# Patient Record
Sex: MALE | Race: BLACK OR AFRICAN AMERICAN | URBAN - METROPOLITAN AREA
[De-identification: names, ages, dates, MRNs, and addresses within clinical notes are randomized per-mention and may not be internally consistent; named-entity substitution may affect disease eponyms.]

---

## 2021-07-15 ENCOUNTER — WALK IN (OUTPATIENT)
Dept: URGENT CARE | Age: 36
End: 2021-07-15

## 2021-07-15 DIAGNOSIS — Z11.1 SCREENING FOR TUBERCULOSIS: Primary | ICD-10-CM

## 2021-07-15 PROCEDURE — 86580 TB INTRADERMAL TEST: CPT | Performed by: NURSE PRACTITIONER

## 2021-07-17 ENCOUNTER — WALK IN (OUTPATIENT)
Dept: URGENT CARE | Age: 36
End: 2021-07-17

## 2021-07-17 DIAGNOSIS — Z11.1 SCREENING FOR TUBERCULOSIS: ICD-10-CM

## 2021-07-17 LAB
INDURATION: 0 MM (ref 0–?)
SKIN TEST RESULT: NEGATIVE

## 2021-07-17 PROCEDURE — X1094 NO CHARGE VISIT: HCPCS | Performed by: NURSE PRACTITIONER

## 2024-06-11 ENCOUNTER — LAB ENCOUNTER (OUTPATIENT)
Dept: LAB | Facility: HOSPITAL | Age: 39
End: 2024-06-11
Attending: INTERNAL MEDICINE
Payer: COMMERCIAL

## 2024-06-11 DIAGNOSIS — K62.5 RECTAL BLEEDING: ICD-10-CM

## 2024-06-11 LAB
ALBUMIN SERPL-MCNC: 4.2 G/DL (ref 3.4–5)
ALBUMIN/GLOB SERPL: 1.1 {RATIO} (ref 1–2)
ALP LIVER SERPL-CCNC: 99 U/L
ALT SERPL-CCNC: 79 U/L
ANION GAP SERPL CALC-SCNC: 6 MMOL/L (ref 0–18)
AST SERPL-CCNC: 47 U/L (ref 15–37)
BASOPHILS # BLD AUTO: 0.03 X10(3) UL (ref 0–0.2)
BASOPHILS NFR BLD AUTO: 0.7 %
BILIRUB SERPL-MCNC: 0.5 MG/DL (ref 0.1–2)
BUN BLD-MCNC: 13 MG/DL (ref 9–23)
CALCIUM BLD-MCNC: 9.6 MG/DL (ref 8.5–10.1)
CHLORIDE SERPL-SCNC: 106 MMOL/L (ref 98–112)
CO2 SERPL-SCNC: 26 MMOL/L (ref 21–32)
CREAT BLD-MCNC: 0.9 MG/DL
EGFRCR SERPLBLD CKD-EPI 2021: 111 ML/MIN/1.73M2 (ref 60–?)
EOSINOPHIL # BLD AUTO: 0.14 X10(3) UL (ref 0–0.7)
EOSINOPHIL NFR BLD AUTO: 3.4 %
ERYTHROCYTE [DISTWIDTH] IN BLOOD BY AUTOMATED COUNT: 14.7 %
FASTING STATUS PATIENT QL REPORTED: YES
GLOBULIN PLAS-MCNC: 3.7 G/DL (ref 2.8–4.4)
GLUCOSE BLD-MCNC: 89 MG/DL (ref 70–99)
HCT VFR BLD AUTO: 45 %
HGB BLD-MCNC: 14.3 G/DL
IMM GRANULOCYTES # BLD AUTO: 0.01 X10(3) UL (ref 0–1)
IMM GRANULOCYTES NFR BLD: 0.2 %
LYMPHOCYTES # BLD AUTO: 2.13 X10(3) UL (ref 1–4)
LYMPHOCYTES NFR BLD AUTO: 51.7 %
MCH RBC QN AUTO: 25.8 PG (ref 26–34)
MCHC RBC AUTO-ENTMCNC: 31.8 G/DL (ref 31–37)
MCV RBC AUTO: 81.2 FL
MONOCYTES # BLD AUTO: 0.39 X10(3) UL (ref 0.1–1)
MONOCYTES NFR BLD AUTO: 9.5 %
NEUTROPHILS # BLD AUTO: 1.42 X10 (3) UL (ref 1.5–7.7)
NEUTROPHILS # BLD AUTO: 1.42 X10(3) UL (ref 1.5–7.7)
NEUTROPHILS NFR BLD AUTO: 34.5 %
OSMOLALITY SERPL CALC.SUM OF ELEC: 286 MOSM/KG (ref 275–295)
PLATELET # BLD AUTO: 228 10(3)UL (ref 150–450)
POTASSIUM SERPL-SCNC: 4.4 MMOL/L (ref 3.5–5.1)
PROT SERPL-MCNC: 7.9 G/DL (ref 6.4–8.2)
RBC # BLD AUTO: 5.54 X10(6)UL
SODIUM SERPL-SCNC: 138 MMOL/L (ref 136–145)
WBC # BLD AUTO: 4.1 X10(3) UL (ref 4–11)

## 2024-06-11 PROCEDURE — 85025 COMPLETE CBC W/AUTO DIFF WBC: CPT

## 2024-06-11 PROCEDURE — 80053 COMPREHEN METABOLIC PANEL: CPT

## 2024-06-11 PROCEDURE — 36415 COLL VENOUS BLD VENIPUNCTURE: CPT

## 2024-06-13 ENCOUNTER — HOSPITAL ENCOUNTER (OUTPATIENT)
Dept: CT IMAGING | Facility: HOSPITAL | Age: 39
Discharge: HOME | End: 2024-06-13
Attending: INTERNAL MEDICINE
Payer: COMMERCIAL

## 2024-06-13 DIAGNOSIS — R10.31 RIGHT LOWER QUADRANT PAIN: ICD-10-CM

## 2024-06-13 DIAGNOSIS — K62.5 RECTAL BLEEDING: ICD-10-CM

## 2024-06-13 PROCEDURE — 74176 CT ABD & PELVIS W/O CONTRAST: CPT | Performed by: INTERNAL MEDICINE

## 2024-07-16 ENCOUNTER — LAB ENCOUNTER (OUTPATIENT)
Dept: LAB | Facility: HOSPITAL | Age: 39
End: 2024-07-16
Attending: INTERNAL MEDICINE
Payer: COMMERCIAL

## 2024-07-16 DIAGNOSIS — R74.8 ELEVATED LIVER ENZYMES: ICD-10-CM

## 2024-07-16 LAB
CHOLEST SERPL-MCNC: 218 MG/DL (ref ?–200)
DEPRECATED HBV CORE AB SER IA-ACNC: 164.2 NG/ML
FASTING PATIENT LIPID ANSWER: YES
HAV AB SER QL IA: REACTIVE
HBV CORE AB SERPL QL IA: REACTIVE
HBV SURFACE AB SER QL: REACTIVE
HBV SURFACE AB SERPL IA-ACNC: 30.03 MIU/ML
HBV SURFACE AG SER-ACNC: 0.13 [IU]/L
HBV SURFACE AG SERPL QL IA: NONREACTIVE
HCV AB SERPL QL IA: NONREACTIVE
HDLC SERPL-MCNC: 37 MG/DL (ref 40–59)
IRON SATN MFR SERPL: 20 %
IRON SERPL-MCNC: 62 UG/DL
LDLC SERPL CALC-MCNC: 111 MG/DL (ref ?–100)
NONHDLC SERPL-MCNC: 181 MG/DL (ref ?–130)
TOTAL IRON BINDING CAPACITY: 308 UG/DL (ref 250–425)
TRANSFERRIN SERPL-MCNC: 243 MG/DL (ref 215–365)
TRIGL SERPL-MCNC: 407 MG/DL (ref 30–149)
VLDLC SERPL CALC-MCNC: 72 MG/DL (ref 0–30)

## 2024-07-16 PROCEDURE — 86708 HEPATITIS A ANTIBODY: CPT

## 2024-07-16 PROCEDURE — 83550 IRON BINDING TEST: CPT

## 2024-07-16 PROCEDURE — 87340 HEPATITIS B SURFACE AG IA: CPT

## 2024-07-16 PROCEDURE — 83540 ASSAY OF IRON: CPT

## 2024-07-16 PROCEDURE — 36415 COLL VENOUS BLD VENIPUNCTURE: CPT

## 2024-07-16 PROCEDURE — 80061 LIPID PANEL: CPT

## 2024-07-16 PROCEDURE — 86704 HEP B CORE ANTIBODY TOTAL: CPT

## 2024-07-16 PROCEDURE — 83516 IMMUNOASSAY NONANTIBODY: CPT

## 2024-07-16 PROCEDURE — 86706 HEP B SURFACE ANTIBODY: CPT

## 2024-07-16 PROCEDURE — 86803 HEPATITIS C AB TEST: CPT

## 2024-07-16 PROCEDURE — 82728 ASSAY OF FERRITIN: CPT

## 2024-07-16 PROCEDURE — 86709 HEPATITIS A IGM ANTIBODY: CPT

## 2024-07-17 LAB
ACTIN SMOOTH MUSCLE AB: 6 UNITS
HAV IGM SER QL: NONREACTIVE

## 2024-07-26 ENCOUNTER — OFFICE VISIT (OUTPATIENT)
Dept: FAMILY MEDICINE CLINIC | Facility: CLINIC | Age: 39
End: 2024-07-26
Payer: COMMERCIAL

## 2024-07-26 VITALS
DIASTOLIC BLOOD PRESSURE: 74 MMHG | TEMPERATURE: 98 F | SYSTOLIC BLOOD PRESSURE: 122 MMHG | WEIGHT: 212.38 LBS | BODY MASS INDEX: 28.45 KG/M2 | RESPIRATION RATE: 20 BRPM | HEART RATE: 65 BPM | HEIGHT: 72.5 IN | OXYGEN SATURATION: 98 %

## 2024-07-26 DIAGNOSIS — Z71.85 IMMUNIZATION COUNSELING: ICD-10-CM

## 2024-07-26 DIAGNOSIS — Z02.89 ENCOUNTER FOR PHYSICAL EXAMINATION RELATED TO EMPLOYMENT: Primary | ICD-10-CM

## 2024-07-26 NOTE — PROGRESS NOTES
CHIEF COMPLAINT:     Chief Complaint   Patient presents with    Employment Physical       HPI:   Ruba Nicole is a 38 year old male who presents for an employment physical exam. 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     Patient has does not have medical concerns.     To the best of your knowledge are you up to date on vaccinations No. Pt is requesting MMR, Varicella, Hep B, TDAP vaccines and TB skin test administration today. Pt is from [facility] and does not have vaccination record with him. He states that his PCP recommended that he come to the walk in clinic to have vaccines administered. Having titers drawn was discussed but pt prefers to have vaccinations since he is unaware of having been vaccinated in the past. We reviewed that we do not have the varicella vaccines, pt states that he does not remember having chicken pox in the past, and that the MMR is a live vaccine. Pt has no known allergies or contradistinctions to getting vaccinations.      Do you anticipate working with hazardous chemicals or materials, infectious agents, or laboratory animals? No     Is there a chance that you will be exposed to human blood or body fluids as a result of routine job duties? No     If your job involves working at a computer, have you had or are you experiencing any discomfort, pain or numbness when working at your desk? No     Does your job require any heavy lifting? Yes: pt states that he has no preexisting back injuries or pain.     Do you feel for any reason that you will not be able to carry out the duties of this job? No     Have you ever had an occupational injury/illness before? No     Do you have any condition (physical, medical, or psychological) that would require special accommodations in order for you to perform you job? No      Current Outpatient Medications   Medication Sig Dispense Refill    polyethylene glycol, PEG 3350-[id number]-NaCl-[id number], 236 g Oral Recon Soln Take as directed by  physician (Patient not taking: Reported on 7/26/2024) 4000 mL 0      Past Medical History:    Abdominal pain    Bloating    Blood in the stool    Flatulence/gas pain/belching    Heartburn    Uncomfortable fullness after meals    Vomiting blood      History reviewed. No pertinent surgical history.   History reviewed. No pertinent family history.   Social History:  Social History     Socioeconomic History    Marital status: Legally    Tobacco Use    Smoking status: Never    Smokeless tobacco: Never   Substance and Sexual Activity    Alcohol use: Yes     Comment: occ    Drug use: Never           REVIEW OF SYSTEMS:   GENERAL: Feels well. No night sweats. No Fevers.   SKIN: denies any unusual skin lesions  EYES:denies blurred vision or double vision  HEENT: denies nasal congestion, sinus pain or sore throat  LUNGS: denies shortness of breath at rest on on exertion  CARDIOVASCULAR: denies chest pain or palpitations   GI: denies abdominal pain or heartburn.  No N/V/C/D.  MUSCULOSKELETAL: denies back pain or other joint pain  NEURO: denies headaches  PSYCHE: denies depression or anxiety  HEMATOLOGIC: denies hx of anemia or other bleeding disorders  ENDOCRINE: denies thyroid history  ALLERGY/ASTHMA: denies hx of seasonal allergies or asthma    EXAM:   /74 (BP Location: Right arm, Patient Position: Sitting, Cuff Size: large)   Pulse 65   Temp 98 °F (36.7 °C) (Oral)   Resp 20   Ht 6' 0.5\" (1.842 m)   Wt 212 lb 6.4 oz (96.3 kg)   SpO2 98%   BMI 28.41 kg/m²  Body mass index is 28.41 kg/m².     GENERAL: well developed, well nourished,in no apparent distress  SKIN: no rashes,no suspicious lesions  HEENT: atraumatic, normocephalic,ears and throat are clear  EYES:PERRLA, EOMI,conjunctiva are clear  NECK: supple,no adenopathy,no bruits  CHEST: no chest tenderness  LUNGS: Clear to auscultation bilaterally. No diminished breath sounds. No wheezing, rhonchi, or rales.  CARDIO: RRR without murmur  MUSCULOSKELETAL:  back is not tender, ROM of the back fully intact, no evidence of scoliosis, Strength 5/5 bilaterally  EXTREMITIES: no cyanosis, clubbing or edema  NEURO: Alert & Oriented x3. Cranial nerves are grossly intact.   PSYCH: Mood and affect are congruent and normal.      ASSESSMENT AND PLAN:   Ruba Nicole is a 38 year old male who presents for an  a employment physical exam.      ASSESSMENT:    PLAN: Patient was found free of any mental or physical condition which would prevent him from performing his work.  Work physical form was filled out. Patient was encouraged to watch exercise and dietary habits.     Form filled out and given to patient.  Form was copied and sent to scanning.     Patient's questions/concerns were addressed and answered. Patient is in agreement with treatment plan.     Proper Lifting techniques reviewed with patient.     Patient is to follow up as needed with PCP or here in clinic.    Discussed receiving varicella vaccines from [facility].     Patient Instructions     You will need to return to clinic in 48-72 hours to have results of TB test read.    Please return to clinic on 7/28 between 11:45 am and 2:20 pm or on 7/29 between 6:30 am and 11:45 pm to have your TB test read.    If you do not return during this time your test will need to be repeated.     Our clinic hours are:  Monday-Friday        8:00 am to 7:30 pm  Saturday/Sunday    9:00 am to 4:30 pm    You can go to any of the [facility] Walk-In Clinics to have your TB test read.  To find your nearest Walk-In Clinic go to [email or web address]       Return in 4 weeks for 2nd MMR vaccine    Return in 3 months and 6 months for 2nd and 3rd Hepatitis B vaccine

## 2024-07-29 ENCOUNTER — APPOINTMENT (OUTPATIENT)
Dept: FAMILY MEDICINE CLINIC | Facility: CLINIC | Age: 39
End: 2024-07-29
Payer: COMMERCIAL

## 2024-07-29 DIAGNOSIS — Z11.1 ENCOUNTER FOR PPD SKIN TEST READING: Primary | ICD-10-CM

## 2024-07-29 LAB
DATE  GIVEN: NORMAL
INDURATION (): 0 MM (ref 0–11)

## 2024-07-31 ENCOUNTER — LAB ENCOUNTER (OUTPATIENT)
Dept: LAB | Age: 39
End: 2024-07-31
Attending: INTERNAL MEDICINE
Payer: COMMERCIAL

## 2024-07-31 DIAGNOSIS — B01.9 VARICELLA WITHOUT COMPLICATION: ICD-10-CM

## 2024-07-31 PROCEDURE — 36415 COLL VENOUS BLD VENIPUNCTURE: CPT

## 2024-07-31 PROCEDURE — 86787 VARICELLA-ZOSTER ANTIBODY: CPT

## 2024-08-02 LAB — VZV IGG SER IA-ACNC: 188.3 (ref 165–?)

## 2024-08-25 ENCOUNTER — NURSE ONLY (OUTPATIENT)
Dept: FAMILY MEDICINE CLINIC | Facility: CLINIC | Age: 39
End: 2024-08-25
Payer: COMMERCIAL

## 2024-08-25 DIAGNOSIS — Z23 NEED FOR MEASLES-MUMPS-RUBELLA (MMR) VACCINE: Primary | ICD-10-CM

## 2024-08-25 PROBLEM — R50.9 FEVER IN ADULT: Status: ACTIVE | Noted: 2022-03-02

## 2024-08-25 PROBLEM — R07.89 CHEST WALL PAIN: Status: ACTIVE | Noted: 2022-03-11

## 2024-08-25 PROBLEM — R10.31 RIGHT LOWER QUADRANT PAIN: Status: ACTIVE | Noted: 2022-03-11

## 2024-08-25 PROBLEM — K62.5 RECTAL HEMORRHAGE: Status: ACTIVE | Noted: 2022-03-11

## 2024-08-25 PROBLEM — R10.13 DYSPEPSIA: Status: ACTIVE | Noted: 2022-03-11

## 2024-08-25 PROBLEM — R12 HEARTBURN: Status: ACTIVE | Noted: 2022-03-11

## 2024-08-25 RX ORDER — SODIUM, POTASSIUM,MAG SULFATES 17.5-3.13G
1 SOLUTION, RECONSTITUTED, ORAL ORAL AS DIRECTED
COMMUNITY
Start: 2022-03-11

## 2024-08-25 RX ORDER — FAMOTIDINE 40 MG/1
40 TABLET, FILM COATED ORAL
COMMUNITY
Start: 2022-03-11

## 2024-08-25 RX ORDER — PLECANATIDE 3 MG/1
1 TABLET ORAL DAILY
COMMUNITY
Start: 2022-03-11

## 2024-08-25 NOTE — PROGRESS NOTES
MMR Vaccine (Measles, Mumps, and Rubella): What You Need to Know   This is a Vaccine Information Statement from the CDC.   Many vaccine information statements are available in [place] and other languages. See www.immunize.org/vis   Hojas de información sobre vacunas están disponibles en español y en muchos otros idiomas. Visite www.immunize.org/vis   1. Why get vaccinated?  MMR vaccine can prevent measles, mumps, and rubella.   MEASLES (M) causes fever, cough, runny nose, and red, watery eyes, commonly followed by a rash that covers the whole body. It can lead to seizures (often associated with fever), ear infections, diarrhea, and pneumonia. Rarely, measles can cause brain damage or death.  MUMPS (M) causes fever, headache, muscle aches, tiredness, loss of appetite, and swollen and tender salivary glands under the ears. It can lead to deafness, swelling of the brain and/or spinal cord covering, painful swelling of the testicles or ovaries, and, very rarely, death.  RUBELLA (R) causes fever, sore throat, rash, headache, and eye irritation. It can cause arthritis in up to half of teenage and adult women. If a person gets rubella while they are pregnant, they could have a miscarriage or the baby could be born with serious birth defects.  Most people who are vaccinated with MMR will be protected for life. Vaccines and high rates of vaccination have made these diseases much less common in the [place].   2. MMR vaccine  Children need 2 doses of MMR vaccine, usually:   First dose at age 12 through 15 months  Second dose at age 4 through 6 years  Infants who will be traveling outside the [place] when they are between 6 and 11 months of age should get a dose of MMR vaccine before travel. These children should still get 2 additional doses at the recommended ages for long-lasting protection.   Older children, adolescents, and adults also need 1 or 2 doses of MMR vaccine if they are not already immune to measles,  mumps, and rubella. Your health care provider can help you determine how many doses you need.   A third dose of MMR might be recommended for certain people in mumps outbreak situations.   MMR vaccine may be given at the same time as other vaccines. Children 12 months through 12 years of age might receive MMR vaccine together with varicella vaccine in a single shot, known as MMRV. Your health care provider can give you more information.   3. Talk with your health care provider  Tell your vaccination provider if the person getting the vaccine:   Has had an allergic reaction after a previous dose of MMR or MMRV vaccine, or has any severe, life-threatening allergies  Is pregnant or thinks they might be pregnant-- pregnant people should not get MMR vaccine  Has a weakened immune system, or has a parent, brother, or sister with a history of hereditary or congenital immune system problems  Has ever had a condition that makes him or her bruise or bleed easily  Has recently had a blood transfusion or received other blood products  Has tuberculosis  Has gotten any other vaccines in the past 4 weeks  In some cases, your health care provider may decide to postpone MMR vaccination until a future visit.   People with minor illnesses, such as a cold, may be vaccinated. People who are moderately or severely ill should usually wait until they recover before getting MMR vaccine.   Your health care provider can give you more information.   4. Risks of a vaccine reaction  Sore arm from the injection or redness where the shot is given, fever, and a mild rash can happen after MMR vaccination.  Swelling of the glands in the cheeks or neck or temporary pain and stiffness in the joints (mostly in teenage or adult women) sometimes occur after MMR vaccination.  More serious reactions happen rarely. These can include seizures (often associated with fever) or temporary low platelet count that can cause unusual bleeding or bruising.  In people  with serious immune system problems, this vaccine may cause an infection that may be life-threatening. People with serious immune system problems should not get MMR vaccine.  People sometimes faint after medical procedures, including vaccination. Tell your provider if you feel dizzy or have vision changes or ringing in the ears.   As with any medicine, there is a very remote chance of a vaccine causing a severe allergic reaction, other serious injury, or death.   5. What if there is a serious problem?  An allergic reaction could occur after the vaccinated person leaves the clinic. If you see signs of a severe allergic reaction (hives, swelling of the face and throat, difficulty breathing, a fast heartbeat, dizziness, or weakness), call 9-1-1 and get the person to the nearest hospital.   For other signs that concern you, call your health care provider.   Adverse reactions should be reported to the Vaccine Adverse Event Reporting System (VAERS). Your health care provider will usually file this report, or you can do it yourself. Visit the VAERS website at www.vaers.hhs.gov or call 1-648.363.5709. VAERS is only for reporting reactions, and VAERS staff members do not give medical advice.   6. The National Vaccine Injury Compensation Program  The National Vaccine Injury Compensation Program (VICP) is a federal program that was created to compensate people who may have been injured by certain vaccines. Claims regarding alleged injury or death due to vaccination have a time limit for filing, which may be as short as two years. Visit the VICP website at www.hrsa.gov/vaccinecompensation or call 1-390.864.4245 to learn about the program and about filing a claim.   7. How can I learn more?  Ask your health care provider.  Call your local or state health department.  Visit the website of the Food and Drug Administration (FDA) for vaccine package inserts and additional information at  www.fda.gov/vaccines-blood-biologics/vaccines.  Contact the Centers for Disease Control and Prevention (CDC): Call 1-216.487.7117 ( 9-077-JKQ-INFO) or  Visit CDC’s website at www.cdc.gov/vaccines.

## 2024-08-26 PROBLEM — R10.31 ABDOMINAL PAIN, RIGHT LOWER QUADRANT: Status: ACTIVE | Noted: 2024-08-26

## 2024-08-26 PROBLEM — R10.9 ABDOMINAL PAIN: Status: ACTIVE | Noted: 2024-08-26

## 2024-08-26 PROBLEM — K62.5 RECTAL BLEEDING: Status: ACTIVE | Noted: 2024-08-26

## 2024-08-26 PROBLEM — D12.0 BENIGN NEOPLASM OF CECUM: Status: ACTIVE | Noted: 2024-08-26

## 2024-08-26 PROBLEM — K21.9 GASTROESOPHAGEAL REFLUX DISEASE: Status: ACTIVE | Noted: 2024-08-26

## 2024-08-26 PROCEDURE — 88305 TISSUE EXAM BY PATHOLOGIST: CPT | Performed by: INTERNAL MEDICINE
